# Patient Record
(demographics unavailable — no encounter records)

---

## 2024-12-04 NOTE — HISTORY OF PRESENT ILLNESS
[FreeTextEntry1] : GYN/Ref:  Ms. 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   Pathology: 10/28/2024 colposcopy ([facility]) A.  ECC: Predominantly mucus with minute fragments of benign endocervical glands.  No squamous epithelium seen, no transformation zone present.  No dysplasia seen. B.  Cervix 4:00: Cervical transformation zone squamous mucosa with focal condylomatous change, no dysplasia. C.  Cervix 11:00: ROBI-2.  PAP: 10/7/24:  LSIL; +HPVmRNA  ([facility])  Health Maintenance:

## 2024-12-04 NOTE — DISCUSSION/SUMMARY
[FreeTextEntry1] : met patient on telemed per her request, unable to come to appt. she understands that visit is limited today due to lack of exam, patient declined to come in today but wanted to discuss options based on path results, recommend cone biopsy - we discussed cone vs. leep procedure patient is unsure if she is ready to come in, urged patient she had high grade dysplasia and needs an intervention all questions answered in detail patient will speak to her mom and discuss scheduling in office visit

## 2025-02-28 NOTE — DISCUSSION/SUMMARY
[FreeTextEntry1] : Discussed with patient review of the biopsy - no residual dysplaisa. I explained that although it appears that there is mild dysplasia and no further dysplasia in the LEEP, given she has declined to come in for physical exam/pelvic exam i am unable to comment on whether the cervix  looks normal and if there is any other lesion there that is concerning outside of what is biopsied and reviewed. Patient states she is unable to come in, urged to f/u with Gyn who did the LEEP and continue per her recommendations for pap smear in 1 year. Otherwise, given i have never performed a pelvic exam on her, i am unable to advise further. all questions were answered continue closer monitoring

## 2025-02-28 NOTE — HISTORY OF PRESENT ILLNESS
[Home] : at home, [unfilled] , at the time of the visit. [Medical Office: ([facility])___] : at the medical office located in  [Telehealth (audio & video)] : This visit was provided via telehealth using real-time 2-way audio visual technology. [FreeTextEntry1] : GYN/Ref:  Ms. 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 She is now s/p LEEP Procedure in [facility], has never come in for evaluation/exam. Wanted telemed visit today to discuss results of LEEP.  Pathology: path review at   Date of Procedure:  12/13/2024 (  [id number])  1.  Endocervical curettings ([id number], Part A): - Benign endocervical epithelium  2.  Cervical biopsy at 4 o'clock ([id number], part B): - Transformation zone mucosa with reactive changes.  3.  Cervical biopsy at 11 o'clock ([id number], Part C): - Low-grade squamous intraoperative lesion (ROBI-I)  4.  Cervix, LEEP ([id number],  Part A): - Transformation zone mucosa with reactive changes, negative for dysplasia.  5.  Endocervical tissue ([id number], 2 H/E, Part B): - Benign endocervical mucosa, negative for dysplasia 10/28/2024 colposcopy ([facility]) A.  ECC: Predominantly mucus with minute fragments of benign endocervical glands.  No squamous epithelium seen, no transformation zone present.  No dysplasia seen. B.  Cervix 4:00: Cervical transformation zone squamous mucosa with focal condylomatous change, no dysplasia. C.  Cervix 11:00: ROBI-2.  PAP: 10/7/24:  LSIL; +HPVmRNA  ([facility])  Health Maintenance: